# Patient Record
Sex: MALE
[De-identification: names, ages, dates, MRNs, and addresses within clinical notes are randomized per-mention and may not be internally consistent; named-entity substitution may affect disease eponyms.]

---

## 2018-10-23 ENCOUNTER — CHARTING TRANS (OUTPATIENT)
Dept: OTHER | Age: 73
End: 2018-10-23

## 2019-02-12 ENCOUNTER — TELEPHONE (OUTPATIENT)
Dept: OTOLARYNGOLOGY | Facility: CLINIC | Age: 74
End: 2019-02-12

## 2019-02-12 ENCOUNTER — OFFICE VISIT (OUTPATIENT)
Dept: AUDIOLOGY | Facility: CLINIC | Age: 74
End: 2019-02-12
Payer: MEDICARE

## 2019-02-12 ENCOUNTER — OFFICE VISIT (OUTPATIENT)
Dept: OTOLARYNGOLOGY | Facility: CLINIC | Age: 74
End: 2019-02-12
Payer: MEDICARE

## 2019-02-12 VITALS — TEMPERATURE: 99 F | SYSTOLIC BLOOD PRESSURE: 128 MMHG | DIASTOLIC BLOOD PRESSURE: 80 MMHG

## 2019-02-12 DIAGNOSIS — H90.3 SENSORINEURAL HEARING LOSS, BILATERAL: Primary | ICD-10-CM

## 2019-02-12 DIAGNOSIS — R42 DIZZINESS: Primary | ICD-10-CM

## 2019-02-12 PROCEDURE — 99203 OFFICE O/P NEW LOW 30 MIN: CPT | Performed by: OTOLARYNGOLOGY

## 2019-02-12 PROCEDURE — 92567 TYMPANOMETRY: CPT | Performed by: AUDIOLOGIST

## 2019-02-12 PROCEDURE — G0463 HOSPITAL OUTPT CLINIC VISIT: HCPCS | Performed by: OTOLARYNGOLOGY

## 2019-02-12 PROCEDURE — 92557 COMPREHENSIVE HEARING TEST: CPT | Performed by: AUDIOLOGIST

## 2019-02-12 RX ORDER — DEXTROAMPHETAMINE SACCHARATE, AMPHETAMINE ASPARTATE MONOHYDRATE, DEXTROAMPHETAMINE SULFATE AND AMPHETAMINE SULFATE 3.75; 3.75; 3.75; 3.75 MG/1; MG/1; MG/1; MG/1
CAPSULE, EXTENDED RELEASE ORAL
Refills: 0 | COMMUNITY
Start: 2019-02-05

## 2019-02-12 RX ORDER — FLUOXETINE HYDROCHLORIDE 40 MG/1
20 CAPSULE ORAL
Refills: 0 | COMMUNITY
Start: 2018-12-09

## 2019-02-12 RX ORDER — ONDANSETRON 4 MG/1
4 TABLET, ORALLY DISINTEGRATING ORAL EVERY 8 HOURS PRN
Qty: 30 TABLET | Refills: 0 | Status: SHIPPED | OUTPATIENT
Start: 2019-02-12 | End: 2019-05-07

## 2019-02-12 NOTE — PROGRESS NOTES
AUDIOGRAM     Bernadette Jarrett was referred for testing by Kendall Carl for dizziness. 8/5/1945  [id number]      Otoscopic inspection: right ear no cerumen; left ear no cerumen.        Tests/Procedures  Patient was tested via  standard insert gildardo

## 2019-02-12 NOTE — PATIENT INSTRUCTIONS
How Hearing Aids Can Help You    If you’re losing your hearing, it can be frustrating: But, hearing aids can help you hear what Shantel Star been missing. Not everyone who has hearing loss needs hearing aids.  But if your hearing loss is keeping you from commun professional medical care. Always follow your healthcare professional's instructions.

## 2019-02-12 NOTE — PROGRESS NOTES
Marta Linares is a 68year old male.   Patient presents with:  Dizziness: Pt reports recurrent episodes of dizziness/nausea throughout the last 1.5 years; right ear feels feels full      HISTORY OF PRESENT ILLNESS  He presents with a 1-1/2-year history of r Size: adult)   Temp 98.5 °F (36.9 °C) (Tympanic)        Constitutional Normal Overall appearance - Normal.   Psychiatric Normal Orientation - Oriented to time, place, person & situation. Appropriate mood and affect.    Neck Exam Normal Inspection - Normal. demonstrates an expected moderate sensorineural hearing loss that he has had for years. Return to see me after balance study.  - OP REFERRAL TO AUDIOLOGY            Missael Newton.  Yue Bush MD    2/12/2019    3:21 PM

## 2019-02-13 NOTE — TELEPHONE ENCOUNTER
Fax received from [facility] for Prior Authorization for meds      •  ondansetron 4 MG Oral Tablet Dispersible, Take 1 tablet (4 mg total) by mouth every 8 (eight) hours as needed for Nausea., Disp: 30 tablet, Rfl: 0    Key# [id number]    Please advis

## 2019-02-13 NOTE — TELEPHONE ENCOUNTER
Prior auth started for zofran. Contact pt insurance [phone], spoke with Marilyn Villafana. Pending. ID [id number].

## 2019-03-11 ENCOUNTER — OFFICE VISIT (OUTPATIENT)
Dept: AUDIOLOGY | Facility: CLINIC | Age: 74
End: 2019-03-11
Payer: MEDICARE

## 2019-03-11 DIAGNOSIS — R42 DIZZINESS: Primary | ICD-10-CM

## 2019-03-11 PROCEDURE — 92540 BASIC VESTIBULAR EVALUATION: CPT | Performed by: AUDIOLOGIST

## 2019-03-11 PROCEDURE — 92537 CALORIC VSTBLR TEST W/REC: CPT | Performed by: AUDIOLOGIST

## 2019-03-13 NOTE — PROGRESS NOTES
Audiometrics:  VNG    Lj Horne  8/5/1945  [id number]    Lj Horne was referred for testing by Fredis Main.      History:    History from visit with Dr. Jaky Sanchez 2/12/2019  He presents with a 1-1/2-year history of recurrent episodes of imbalan test.    Memo-Hallpike Maneuver: No evidence of torsional nystagmus during the [place] maneuver.     Positioning Test: Positional Nystagmus:     Sitting: consistent and L beating, 3 deg/sec  Supine: consistent and L beating, 4 deg/sec  Head Right: consiste preponderance by itself is a non-localizing finding. This is most often seen in patients who have spontaneous nystagmus      VNG findings include spontaneous left-beating nystagmus as well as a right unilateral weakness.  The presence of the spontaneous ny

## 2019-03-19 ENCOUNTER — TELEPHONE (OUTPATIENT)
Dept: AUDIOLOGY | Facility: CLINIC | Age: 74
End: 2019-03-19

## 2019-03-19 NOTE — TELEPHONE ENCOUNTER
Please let him know that the study showed a possible abnormal response of the right inner ear to stimulation. This would suggest that he has a right-sided inner ear weakness which could be causing some of his imbalance.   I would like for him to return to

## 2019-04-02 NOTE — PROGRESS NOTES
Please let him know that his VNG showed some abnormalities in the function of his inner ear. I would like for him to return to clinic to see me. We will discuss the results and discuss further management.   Please see how he is been doing using home vesti

## 2019-04-05 ENCOUNTER — OFFICE VISIT (OUTPATIENT)
Dept: OTOLARYNGOLOGY | Facility: CLINIC | Age: 74
End: 2019-04-05
Payer: MEDICARE

## 2019-04-05 VITALS
TEMPERATURE: 98 F | WEIGHT: 155 LBS | BODY MASS INDEX: 25.83 KG/M2 | SYSTOLIC BLOOD PRESSURE: 157 MMHG | DIASTOLIC BLOOD PRESSURE: 76 MMHG | HEIGHT: 65 IN

## 2019-04-05 DIAGNOSIS — R42 DIZZINESS: Primary | ICD-10-CM

## 2019-04-05 PROCEDURE — 99214 OFFICE O/P EST MOD 30 MIN: CPT | Performed by: OTOLARYNGOLOGY

## 2019-04-05 PROCEDURE — G0463 HOSPITAL OUTPT CLINIC VISIT: HCPCS | Performed by: OTOLARYNGOLOGY

## 2019-04-05 NOTE — PROGRESS NOTES
Carissa Delcid is a 68year old male.   Patient presents with:  Results: VNG done on 3-11-19, pt only did vestibular exercises once       HISTORY OF PRESENT ILLNESS  He presents with a 1-1/2-year history of recurrent episodes of imbalance with associated west vision changes. Respiratory Negative Dyspnea and wheezing. Cardio Negative Chest pain, irregular heartbeat/palpitations and syncope. GI Negative Abdominal pain and diarrhea. Endocrine Negative Cold intolerance and heat intolerance.    Neuro Negative Cap, TK ONE C PO D, Disp: , Rfl: 0  •  ondansetron 4 MG Oral Tablet Dispersible, Take 1 tablet (4 mg total) by mouth every 8 (eight) hours as needed for Nausea., Disp: 30 tablet, Rfl: 0  ASSESSMENT AND PLAN    1.  Dizziness  We discussed his balance study r

## 2019-05-07 ENCOUNTER — OFFICE VISIT (OUTPATIENT)
Dept: OTOLARYNGOLOGY | Facility: CLINIC | Age: 74
End: 2019-05-07
Payer: MEDICARE

## 2019-05-07 ENCOUNTER — OFFICE VISIT (OUTPATIENT)
Dept: AUDIOLOGY | Facility: CLINIC | Age: 74
End: 2019-05-07
Payer: MEDICARE

## 2019-05-07 ENCOUNTER — TELEPHONE (OUTPATIENT)
Dept: OTOLARYNGOLOGY | Facility: CLINIC | Age: 74
End: 2019-05-07

## 2019-05-07 VITALS
TEMPERATURE: 98 F | WEIGHT: 155 LBS | SYSTOLIC BLOOD PRESSURE: 124 MMHG | HEIGHT: 65 IN | BODY MASS INDEX: 25.83 KG/M2 | DIASTOLIC BLOOD PRESSURE: 68 MMHG

## 2019-05-07 DIAGNOSIS — H90.3 SENSORINEURAL HEARING LOSS, BILATERAL: Primary | ICD-10-CM

## 2019-05-07 DIAGNOSIS — H90.3 SENSORINEURAL HEARING LOSS, BILATERAL: ICD-10-CM

## 2019-05-07 DIAGNOSIS — R42 DIZZINESS: Primary | ICD-10-CM

## 2019-05-07 PROCEDURE — 92567 TYMPANOMETRY: CPT | Performed by: AUDIOLOGIST

## 2019-05-07 PROCEDURE — G0463 HOSPITAL OUTPT CLINIC VISIT: HCPCS | Performed by: OTOLARYNGOLOGY

## 2019-05-07 PROCEDURE — 92557 COMPREHENSIVE HEARING TEST: CPT | Performed by: AUDIOLOGIST

## 2019-05-07 PROCEDURE — 99214 OFFICE O/P EST MOD 30 MIN: CPT | Performed by: OTOLARYNGOLOGY

## 2019-05-07 RX ORDER — VALACYCLOVIR HYDROCHLORIDE 500 MG/1
500 TABLET, FILM COATED ORAL EVERY 8 HOURS
Qty: 21 TABLET | Refills: 0 | Status: SHIPPED | OUTPATIENT
Start: 2019-05-07 | End: 2019-05-14

## 2019-05-07 RX ORDER — ONDANSETRON 4 MG/1
4 TABLET, ORALLY DISINTEGRATING ORAL EVERY 8 HOURS PRN
Qty: 30 TABLET | Refills: 0 | Status: SHIPPED | OUTPATIENT
Start: 2019-05-07 | End: 2019-11-12 | Stop reason: ALTCHOICE

## 2019-05-07 RX ORDER — PREDNISONE 10 MG/1
TABLET ORAL
Qty: 44 TABLET | Refills: 0 | Status: SHIPPED | OUTPATIENT
Start: 2019-05-07 | End: 2019-05-21 | Stop reason: ALTCHOICE

## 2019-05-07 NOTE — TELEPHONE ENCOUNTER
Current Outpatient Medications:   •  ondansetron 4 MG Oral Tablet Dispersible, Take 1 tablet (4 mg total) by mouth every 8 (eight) hours as needed for Nausea., Disp: 30 tablet, Rfl: 0

## 2019-05-07 NOTE — PROGRESS NOTES
AUDIOLOGY REPORT      Jina Bolden is a 68year old male     Referring Provider: Cheli Granger   YOB: 1945  Medical Record: [id number]      Patient Hearing History:  Patient reported speech in right ear feels very muffled/not clear.    Paulina

## 2019-05-07 NOTE — TELEPHONE ENCOUNTER
Insurance calling in regards to zofran authorization.  Requesting call back at phone#  [phone]  [address]  Request number# [id number]

## 2019-05-07 NOTE — PROGRESS NOTES
Amy Carrero is a 68year old male.   Patient presents with:  Ear Problem: pt would like right ear checked, c/o fullness in the right ear for 1 week       HISTORY OF PRESENT ILLNESS  He presents with a 1-1/2-year history of recurrent episodes of imbalance Problem Relation Age of Onset   • Cancer Mother        Past Medical History:   Diagnosis Date   • Anxiety    • Depression        Past Surgical History:   Procedure Laterality Date   • OTHER SURGICAL HISTORY      deviated nasal septum repair         REVIE Supraclavicular.         Nose/Mouth/Throat Normal External nose - Normal. Lips/teeth/gums - Normal. Tonsils - Normal. Oropharynx - Normal.   Nose/Mouth/Throat Normal Nares - Right: Normal Left: Normal. Septum -Normal  Turbinates - Right: Normal, Left: Hermilo Faustin

## 2019-05-21 ENCOUNTER — OFFICE VISIT (OUTPATIENT)
Dept: AUDIOLOGY | Facility: CLINIC | Age: 74
End: 2019-05-21
Payer: MEDICARE

## 2019-05-21 ENCOUNTER — OFFICE VISIT (OUTPATIENT)
Dept: OTOLARYNGOLOGY | Facility: CLINIC | Age: 74
End: 2019-05-21
Payer: MEDICARE

## 2019-05-21 VITALS
WEIGHT: 155 LBS | DIASTOLIC BLOOD PRESSURE: 85 MMHG | HEIGHT: 65 IN | BODY MASS INDEX: 25.83 KG/M2 | SYSTOLIC BLOOD PRESSURE: 142 MMHG | TEMPERATURE: 99 F

## 2019-05-21 DIAGNOSIS — H90.3 SENSORINEURAL HEARING LOSS, BILATERAL: ICD-10-CM

## 2019-05-21 DIAGNOSIS — H90.3 SENSORINEURAL HEARING LOSS, BILATERAL: Primary | ICD-10-CM

## 2019-05-21 DIAGNOSIS — H91.93 BILATERAL HEARING LOSS, UNSPECIFIED HEARING LOSS TYPE: Primary | ICD-10-CM

## 2019-05-21 DIAGNOSIS — R42 DIZZINESS: ICD-10-CM

## 2019-05-21 PROCEDURE — 92557 COMPREHENSIVE HEARING TEST: CPT | Performed by: AUDIOLOGIST

## 2019-05-21 PROCEDURE — G0463 HOSPITAL OUTPT CLINIC VISIT: HCPCS | Performed by: OTOLARYNGOLOGY

## 2019-05-21 PROCEDURE — 99214 OFFICE O/P EST MOD 30 MIN: CPT | Performed by: OTOLARYNGOLOGY

## 2019-05-21 NOTE — PROGRESS NOTES
AUDIOLOGY REPORT      Bernadette Jarrett is a 68year old male     Referring Provider: Kendall Carl   YOB: 1945  Medical Record: [id number]      Patient Hearing History:  Patient was seen for repeat audiogram.       Test/Procedures:    The patient

## 2019-05-21 NOTE — PROGRESS NOTES
Cleve De Santiago is a 68year old male. Patient presents with:   Follow - Up: regarding SNHL bilateral, no change in symptoms       HISTORY OF PRESENT ILLNESS  He presents with a 1-1/2-year history of recurrent episodes of imbalance with associated nausea and education level: Not on file    Tobacco Use      Smoking status: Never Smoker      Smokeless tobacco: Former User    Substance and Sexual Activity      Alcohol use: Yes      Drug use: No      Family History   Problem Relation Age of Onset   • Cancer Mother Normal. Canal - Right: Normal, Left: Normal. TM - Right: Normal, Left: Normal.   Skin Normal Inspection - Normal.        Lymph Detail Normal Submental. Submandibular. Anterior cervical. Posterior cervical. Supraclavicular.         Nose/Mouth/Throat Normal E

## 2019-08-23 ENCOUNTER — TELEPHONE (OUTPATIENT)
Dept: OTOLARYNGOLOGY | Facility: CLINIC | Age: 74
End: 2019-08-23

## 2019-08-23 NOTE — TELEPHONE ENCOUNTER
Pt asking for office to fax HT results to [facility] at [phone]. Asking for cb once results are faxed. Pls advise thank you.

## 2019-09-25 ENCOUNTER — TELEPHONE (OUTPATIENT)
Dept: SCHEDULING | Age: 74
End: 2019-09-25

## 2019-10-22 ENCOUNTER — OFFICE VISIT (OUTPATIENT)
Dept: AUDIOLOGY | Facility: CLINIC | Age: 74
End: 2019-10-22
Payer: MEDICARE

## 2019-10-22 ENCOUNTER — OFFICE VISIT (OUTPATIENT)
Dept: OTOLARYNGOLOGY | Facility: CLINIC | Age: 74
End: 2019-10-22
Payer: MEDICARE

## 2019-10-22 VITALS — BODY MASS INDEX: 25.83 KG/M2 | HEIGHT: 65 IN | WEIGHT: 155 LBS

## 2019-10-22 DIAGNOSIS — H91.93 BILATERAL HEARING LOSS, UNSPECIFIED HEARING LOSS TYPE: Primary | ICD-10-CM

## 2019-10-22 DIAGNOSIS — H90.3 SENSORINEURAL HEARING LOSS, BILATERAL: Primary | ICD-10-CM

## 2019-10-22 PROCEDURE — 92557 COMPREHENSIVE HEARING TEST: CPT | Performed by: AUDIOLOGIST

## 2019-10-22 PROCEDURE — 99214 OFFICE O/P EST MOD 30 MIN: CPT | Performed by: OTOLARYNGOLOGY

## 2019-10-22 PROCEDURE — 92567 TYMPANOMETRY: CPT | Performed by: AUDIOLOGIST

## 2019-10-22 PROCEDURE — G0463 HOSPITAL OUTPT CLINIC VISIT: HCPCS | Performed by: OTOLARYNGOLOGY

## 2019-10-22 RX ORDER — PREDNISONE 10 MG/1
TABLET ORAL
Qty: 44 TABLET | Refills: 0 | Status: SHIPPED | OUTPATIENT
Start: 2019-10-22 | End: 2019-11-12 | Stop reason: ALTCHOICE

## 2019-10-22 NOTE — PROGRESS NOTES
AUDIOLOGY REPORT      Jose Alejandro Ornelas is a 76year old male     Referring Provider: Magda Da Silva   YOB: 1945  Medical Record: [id number]      Patient Hearing History:  Patient is here for repeat hearing testing.    Last audiogram was completed

## 2019-10-22 NOTE — PROGRESS NOTES
Marta Linares is a 76year old male. Patient presents with:   Follow - Up: pt here for a follow up on SNHL bilateral, per pt-notice that music feels out of tune, trouble hearing       HISTORY OF PRESENT ILLNESS  He presents with a 1-1/2-year history of rec noticing his perception of music was altered and he felt that the music was actually out of tune. Still having the same situation on the right side since losing his hearing some months ago.   He uses a hearing aid and a Bluetooth device to listen to music - Right: Normal, Left: Normal. Pupil - Right: Normal, Left: Normal. Fundus - Right: Normal, Left: Normal.   Neurological Normal Memory - Normal. Cranial nerves - Cranial nerves II through XII grossly intact.    Head/Face Normal Facial features - Normal. Eye retrocochlear or intracranial process. Return to clinic after study.  - OP REFERRAL TO AUDIOLOGY  - MRI BRAIN (W+WO) (CPT=70553); Future  - MRI IACS (W+WO) (CPT=70553); Future            Rene Bustos MD    10/22/2019    5:15 PM

## 2019-11-06 ENCOUNTER — HOSPITAL ENCOUNTER (OUTPATIENT)
Dept: MRI IMAGING | Facility: HOSPITAL | Age: 74
Discharge: HOME | End: 2019-11-06
Attending: OTOLARYNGOLOGY
Payer: MEDICARE

## 2019-11-06 DIAGNOSIS — H91.93 BILATERAL HEARING LOSS, UNSPECIFIED HEARING LOSS TYPE: ICD-10-CM

## 2019-11-06 PROCEDURE — A9575 INJ GADOTERATE MEGLUMI 0.1ML: HCPCS | Performed by: OTOLARYNGOLOGY

## 2019-11-06 PROCEDURE — 82565 ASSAY OF CREATININE: CPT

## 2019-11-06 PROCEDURE — 70553 MRI BRAIN STEM W/O & W/DYE: CPT | Performed by: OTOLARYNGOLOGY

## 2019-11-12 ENCOUNTER — OFFICE VISIT (OUTPATIENT)
Dept: OTOLARYNGOLOGY | Facility: CLINIC | Age: 74
End: 2019-11-12
Payer: MEDICARE

## 2019-11-12 VITALS
DIASTOLIC BLOOD PRESSURE: 81 MMHG | HEIGHT: 65 IN | BODY MASS INDEX: 24.99 KG/M2 | SYSTOLIC BLOOD PRESSURE: 150 MMHG | WEIGHT: 150 LBS | TEMPERATURE: 98 F

## 2019-11-12 DIAGNOSIS — H91.93 BILATERAL HEARING LOSS, UNSPECIFIED HEARING LOSS TYPE: Primary | ICD-10-CM

## 2019-11-12 DIAGNOSIS — R42 DIZZINESS: ICD-10-CM

## 2019-11-12 PROCEDURE — 99214 OFFICE O/P EST MOD 30 MIN: CPT | Performed by: OTOLARYNGOLOGY

## 2019-11-12 PROCEDURE — G0463 HOSPITAL OUTPT CLINIC VISIT: HCPCS | Performed by: OTOLARYNGOLOGY

## 2019-11-12 RX ORDER — VALACYCLOVIR HYDROCHLORIDE 500 MG/1
TABLET, FILM COATED ORAL
COMMUNITY
End: 2019-11-12 | Stop reason: ALTCHOICE

## 2019-11-12 RX ORDER — DICYCLOMINE HCL 20 MG
TABLET ORAL
COMMUNITY
End: 2019-11-12 | Stop reason: ALTCHOICE

## 2019-11-13 NOTE — PROGRESS NOTES
Dawna Ramírez. is a 76year old male.   Patient presents with:  Results: MRI Brain/IACS done on 11-6-19      HISTORY OF PRESENT ILLNESS  He presents with a 1-1/2-year history of recurrent episodes of imbalance with associated nausea and emesis.  Had si actually out of tune. Still having the same situation on the right side since losing his hearing some months ago. He uses a hearing aid and a Bluetooth device to listen to music on his phone.   No other signs, symptoms or complaints     11/12/19 he presen Integumentary Negative Frequent skin infections, pigment change and rash. Hema/Lymph Negative Easy bleeding and easy bruising. PHYSICAL EXAM    /81   Temp 98.1 °F (36.7 °C) (Tympanic)   Ht 5' 5\" (1.651 m)   Wt 150 lb (68 kg)   BMI 24. 9 of previous infarcts intracranially and I am concerned that he may have some type of process leading to recurrent mini strokes.   I did recommend that he take a copy of his results and follow-up with at least his primary care physician if not with a neurolo

## 2020-01-28 ENCOUNTER — OFFICE VISIT (OUTPATIENT)
Dept: NEUROLOGY | Facility: CLINIC | Age: 75
End: 2020-01-28
Payer: MEDICARE

## 2020-01-28 VITALS
DIASTOLIC BLOOD PRESSURE: 74 MMHG | HEART RATE: 64 BPM | BODY MASS INDEX: 24.99 KG/M2 | WEIGHT: 150 LBS | HEIGHT: 65 IN | SYSTOLIC BLOOD PRESSURE: 116 MMHG

## 2020-01-28 DIAGNOSIS — R90.82 WHITE MATTER ABNORMALITY ON MRI OF BRAIN: Primary | ICD-10-CM

## 2020-01-28 DIAGNOSIS — D18.02 VENOUS ANGIOMA OF BRAIN (HCC): ICD-10-CM

## 2020-01-28 PROCEDURE — 99204 OFFICE O/P NEW MOD 45 MIN: CPT | Performed by: OTHER

## 2020-01-28 RX ORDER — ASPIRIN 81 MG/1
81 TABLET ORAL DAILY
Refills: 0 | COMMUNITY
Start: 2020-01-28

## 2020-01-28 NOTE — PROGRESS NOTES
Neurology Outpatient Consult Note    Carlus Cowden. : 1945   Referring Physician: Dr. Laurie Opsina  HPI:     Carlus Cowden. is a 76year old male who is being seen in neurologic evaluation.     Patient being seen in evaluation for abnormal Smokeless tobacco: Former User    Substance and Sexual Activity      Alcohol use: Yes      Drug use: No        ROS:   GENERAL: no fevers, no chills  SKIN: no new lesions or rashes  HEENT: See HPI  RESPIRATORY: no shortness of breath, no wheezing  CARDIO white matter ischemic disease/small strokes.       –I discussed my clinical impressions and recommendations at length with patient and wife in lay terms and addressed their questions and concerns; we reviewed the MRI of the brain in detail    –We discussed

## 2020-01-28 NOTE — PATIENT INSTRUCTIONS
Healthy Lifestyle to Prevent Another Stroke  Breaking old habits can be hard. But when your health is at stake, it’s never too late to make changes for the better. Some lifestyle changes might be easy for you. Others might be tough.  So if you need help, · Exercise. Strength and aerobic training improves your ability to function and do activities of daily living. It also reduces your risk for another stroke. Develop a custom plan with your physical therapist to meet activity goals.     If you have high bloo Eating healthy foods helps lower cholesterol and reduce plaque buildup in arteries. It can also help you lose weight and keep high blood pressure under control.  Eating better doesn’t necessarily mean going on a special diet, unless you have diabetes or hig 4. About one-quarter of the plate can be starchy foods, such as rice and potatoes. Whole-grains, such as brown rice or whole-wheat bread, are best.  Try healthier options  Giving up old food habits doesn’t have to be hard.  Encouragement makes it easier to © 4255-8580 [employer] [address]. [address] [facility] [place], [address]. All rights reserved. This information is not intended as a substitute for professional medical care. Always follow your healthcare professional's instructions.         Prevent · Walk farther each week. Try walking a little farther or longer each week. You may be surprised by how fast you improve! When to stop  If you’re new to exercising, it’s normal to feel a little sore afterward.  But you should stop right away if you:  · Hav · Movement problems. You may have sudden trouble walking, dizziness, a feeling of spinning, a loss of balance, a feeling of falling, or blackouts. · Seizure.  You may also have a seizure with a large or hemorrhagic stroke.   Remember: If you have any of th

## (undated) NOTE — LETTER
Darlin Runner, Md  [address], [address] [facility]       02/12/19        Patient: Jaimee Fernández   YOB: 1945   Date of Visit: 2/12/2019       Dear  Dr. Endy Farfan MD,      Thank you for referring Jaimee Fernández to my practice.   Please f